# Patient Record
Sex: FEMALE | Race: AMERICAN INDIAN OR ALASKA NATIVE
[De-identification: names, ages, dates, MRNs, and addresses within clinical notes are randomized per-mention and may not be internally consistent; named-entity substitution may affect disease eponyms.]

---

## 2019-04-08 ENCOUNTER — HOSPITAL ENCOUNTER (EMERGENCY)
Dept: HOSPITAL 42 - ED | Age: 39
Discharge: HOME | End: 2019-04-08
Payer: COMMERCIAL

## 2019-04-08 VITALS — OXYGEN SATURATION: 100 % | TEMPERATURE: 98.6 F

## 2019-04-08 VITALS — HEART RATE: 73 BPM | SYSTOLIC BLOOD PRESSURE: 122 MMHG | DIASTOLIC BLOOD PRESSURE: 78 MMHG

## 2019-04-08 VITALS — BODY MASS INDEX: 26.8 KG/M2

## 2019-04-08 VITALS — RESPIRATION RATE: 18 BRPM

## 2019-04-08 DIAGNOSIS — R42: Primary | ICD-10-CM

## 2019-04-08 DIAGNOSIS — I27.20: ICD-10-CM

## 2019-04-08 DIAGNOSIS — R06.02: ICD-10-CM

## 2019-04-08 LAB
ALBUMIN SERPL-MCNC: 4.2 G/DL (ref 3–4.8)
ALBUMIN/GLOB SERPL: 1 {RATIO} (ref 1.1–1.8)
ALT SERPL-CCNC: 22 U/L (ref 7–56)
AMORPH SED URNS QL MICRO: (no result) /HPF
APPEARANCE UR: CLEAR
APTT BLD: 36.3 SECONDS (ref 26.9–38.3)
AST SERPL-CCNC: 33 U/L (ref 14–36)
BACTERIA #/AREA URNS HPF: (no result) /HPF
BASOPHILS # BLD AUTO: 0.02 K/MM3 (ref 0–2)
BASOPHILS NFR BLD: 0.3 % (ref 0–3)
BILIRUB UR-MCNC: NEGATIVE MG/DL
BNP SERPL-MCNC: 36.5 PG/ML (ref 0–450)
BUN SERPL-MCNC: 14 MG/DL (ref 7–21)
CALCIUM SERPL-MCNC: 9.1 MG/DL (ref 8.4–10.5)
COLOR UR: YELLOW
D DIMER PPP FEU-MCNC: 962 NG/MLDDU (ref 0–243)
EOSINOPHIL # BLD: 0.1 10*3/UL (ref 0–0.7)
EOSINOPHIL NFR BLD: 1.1 % (ref 1.5–5)
ERYTHROCYTE [DISTWIDTH] IN BLOOD BY AUTOMATED COUNT: 13.2 % (ref 11.5–14.5)
GFR NON-AFRICAN AMERICAN: > 60
GLUCOSE UR STRIP-MCNC: NEGATIVE MG/DL
HGB BLD-MCNC: 13.5 G/DL (ref 12–16)
INR PPP: 1.06
LEUKOCYTE ESTERASE UR-ACNC: NEGATIVE LEU/UL
LYMPHOCYTES # BLD: 2.5 10*3/UL (ref 1.2–3.4)
LYMPHOCYTES NFR BLD AUTO: 33.9 % (ref 22–35)
MCH RBC QN AUTO: 30.6 PG (ref 25–35)
MCHC RBC AUTO-ENTMCNC: 33.6 G/DL (ref 31–37)
MCV RBC AUTO: 91.2 FL (ref 80–105)
MONOCYTES # BLD AUTO: 0.5 10*3/UL (ref 0.1–0.6)
MONOCYTES NFR BLD: 7.2 % (ref 1–6)
PH UR STRIP: 7 [PH] (ref 4.7–8)
PLATELET # BLD: 249 10^3/UL (ref 120–450)
PMV BLD AUTO: 11.3 FL (ref 7–11)
PROT UR STRIP-MCNC: 30 MG/DL
PROTHROMBIN TIME: 12 SECONDS (ref 9.4–12.5)
RBC # BLD AUTO: 4.41 10^6/UL (ref 3.5–6.1)
RBC # UR STRIP: NEGATIVE /UL
RBC #/AREA URNS HPF: (no result) /HPF (ref 0–2)
SP GR UR STRIP: 1.02 (ref 1–1.03)
TROPONIN I SERPL-MCNC: < 0.01 NG/ML
UROBILINOGEN UR STRIP-ACNC: 1 E.U./DL
WBC # BLD AUTO: 7.4 10^3/UL (ref 4.5–11)

## 2019-04-08 PROCEDURE — 84703 CHORIONIC GONADOTROPIN ASSAY: CPT

## 2019-04-08 PROCEDURE — 71275 CT ANGIOGRAPHY CHEST: CPT

## 2019-04-08 PROCEDURE — 85025 COMPLETE CBC W/AUTO DIFF WBC: CPT

## 2019-04-08 PROCEDURE — 84484 ASSAY OF TROPONIN QUANT: CPT

## 2019-04-08 PROCEDURE — 80053 COMPREHEN METABOLIC PANEL: CPT

## 2019-04-08 PROCEDURE — 83615 LACTATE (LD) (LDH) ENZYME: CPT

## 2019-04-08 PROCEDURE — 83735 ASSAY OF MAGNESIUM: CPT

## 2019-04-08 PROCEDURE — 81001 URINALYSIS AUTO W/SCOPE: CPT

## 2019-04-08 PROCEDURE — 85730 THROMBOPLASTIN TIME PARTIAL: CPT

## 2019-04-08 PROCEDURE — 81025 URINE PREGNANCY TEST: CPT

## 2019-04-08 PROCEDURE — 85378 FIBRIN DEGRADE SEMIQUANT: CPT

## 2019-04-08 PROCEDURE — 99284 EMERGENCY DEPT VISIT MOD MDM: CPT

## 2019-04-08 PROCEDURE — 71045 X-RAY EXAM CHEST 1 VIEW: CPT

## 2019-04-08 PROCEDURE — 83880 ASSAY OF NATRIURETIC PEPTIDE: CPT

## 2019-04-08 PROCEDURE — 85610 PROTHROMBIN TIME: CPT

## 2019-04-08 PROCEDURE — 93005 ELECTROCARDIOGRAM TRACING: CPT

## 2019-04-08 PROCEDURE — 82550 ASSAY OF CK (CPK): CPT

## 2019-04-08 NOTE — CT
Date of service: 



04/08/2019



PROCEDURE:  CT Chest with contrast (Pulmonary Angiogram)



HISTORY:

sob elevated dimer



COMPARISON:

None available.



TECHNIQUE:

Axial computed tomography images were obtained of the chest in the 

pulmonary arterial phase of enhancement. Coronal and sagittal 

reformatted images were created and reviewed.



Intravenous contrast dose: 100 mL Omnipaque 350



Radiation dose:



Total exam DLP = 472.8 mGy-cm.



This CT exam was performed using one or more of the following dose 

reduction techniques: Automated exposure control, adjustment of the 

mA and/or kV according to patient size, and/or use of iterative 

reconstruction technique.



FINDINGS:



PULMONARY ARTERIES:

Unremarkable. No pulmonary embolism. 



AORTA:

No acute findings. No thoracic aortic aneurysm. No aortic 

atherosclerotic calcification or mural plaque present.



LUNGS:

Unremarkable. No nodule, mass or pulmonary consolidation. 



PLEURAL SPACES:

Unremarkable. No effusion or pneumothorax. 



HEART:

Unremarkable. No cardiomegaly. No significant pericardial effusion. 



LYMPH NODES:

No lymphadenopathy.



BONES, CHEST WALL:

Unremarkable. No fracture or destructive lesion 



OTHER FINDINGS:

1.5 cm nodule in right lobe of thyroid.  Correlation with thyroid 

ultrasound examination is advised on a nonemergent basis. 



IMPRESSION:

No evidence of pulmonary embolism.  No infiltrate or pleural 

effusion.  Incidental 1.5 cm nodule in right lobe of thyroid.  

Correlate with thyroid ultrasound examination.

## 2019-04-08 NOTE — ED PDOC
Arrival/HPI





- General


Time Seen by Provider: 04/08/19 09:30


Historian: Patient





- History of Present Illness


Narrative History of Present Illness (Text): 





04/08/19 09:46


39 year old female, with past medical history of pulmonary hypertension and 

asthma, presents to the ED for evaluation of dizziness/weakness prior to 

arrival. Patient states she was trying to catch a train when she experienced 

difficulty catching full breath and lightheadedness prompting her to present to 

the ED for medical evaluation. At bedside, patient is in no acute distress and 

has appropriate respiration. Patient denies any fevers, chills, headache, chest 

pain, cough, diaphoresis, abdominal pain, nausea, vomiting, diarrhea, back pain,

neck pain, or any other complaints.


 


Time/Duration: Prior to Arrival


Symptom Onset: Gradual


Symptom Course: Unchanged


Activities at Onset: Light





Past Medical History





- Provider Review


Nursing Documentation Reviewed: Yes





- Infectious Disease


Hx of Infectious Diseases: None





- Tetanus Immunization


Tetanus Immunization: Unknown





- Cardiac


Other/Comment: open heart surgery @ 3yo.  pulmonary stenosis





- Hematological/Oncological


Hx Anemia: Yes





- Psychiatric


Hx Depression: No


Hx Emotional Abuse: No


Hx Physical Abuse: No


Hx Substance Use: No





- Surgical History


Other/Comment: heart surgery @ 2y.o.





- Anesthesia


Hx Anesthesia: Yes


Hx Anesthesia Reactions: No


Hx Malignant Hyperthermia: No





- Suicidal Assessment


Feels Threatened In Home Enviroment: No





Family/Social History





- Physician Review


Nursing Documentation Reviewed: Yes


Family/Social History: Unknown Family HX


Smoking Status: Never Smoked


Hx Alcohol Use: Yes


Hx Substance Use: No


Hx Substance Use Treatment: No





Allergies/Home Meds


Allergies/Adverse Reactions: 


Allergies





aspirin Allergy (Verified 04/08/19 10:02)


   SHORTNESS OF BREATH


nickel Allergy (Verified 04/08/19 10:02)


   RASH


pineapple Allergy (Verified 04/08/19 10:02)


   ITCHING








Home Medications: 


                                    Home Meds











 Medication  Instructions  Recorded  Confirmed


 


Clonazepam [Klonopin] 0.5 mg PO PRN PRN 10/30/13 10/30/13














Review of Systems





- Physician Review


All systems were reviewed & negative as marked: Yes





- Review of Systems


Constitutional: absent: Fevers


Eyes: absent: Vision Changes


ENT: absent: Hearing Changes


Respiratory: SOB.  absent: Cough, Wheezing


Cardiovascular: absent: Chest Pain, Palpitations, JENNINGS, Syncope


Gastrointestinal: absent: Abdominal Pain, Diarrhea, Nausea, Vomiting


Genitourinary Female: absent: Dysuria, Urine Output Changes


Musculoskeletal: absent: Back Pain, Neck Pain


Skin: absent: Rash


Neurological: Dizziness.  absent: Headache, Focal Weakness, Gait Changes, Speech

 Changes


Endocrine: absent: Diaphoresis


Psychiatric: absent: Anxiety





Physical Exam


Vital Signs Reviewed: Yes





Vital Signs











  Temp Pulse Resp BP Pulse Ox


 


 04/08/19 09:40  98.4 F  86  18  146/92 H  98











Temperature: Afebrile


Blood Pressure: Normal


Pulse: Regular


Respiratory Rate: Normal


Appearance: Positive for: Well-Appearing, Non-Toxic, Comfortable


Pain Distress: None


Mental Status: Positive for: Alert and Oriented X 3





- Systems Exam


Head: Present: Atraumatic, Normocephalic


Pupils: Present: PERRL


Extroacular Muscles: Present: EOMI


Conjunctiva: Present: Normal


Pharnyx: Present: ERYTHEMA


Neck: Present: Normal Range of Motion


Respiratory/Chest: Present: Clear to Auscultation, Good Air Exchange.  No: 

Respiratory Distress, Accessory Muscle Use


Cardiovascular: Present: Regular Rate and Rhythm, Normal S1, S2.  No: Murmurs


Abdomen: No: Tenderness, Distention, Peritoneal Signs


Back: Present: Normal Inspection


Upper Extremity: Present: Normal Inspection.  No: Cyanosis, Edema


Lower Extremity: Present: Normal Inspection.  No: Edema


Neurological: Present: GCS=15, CN II-XII Intact, Speech Normal


Skin: Present: Warm, Dry, Normal Color.  No: Rashes


Psychiatric: Present: Alert, Oriented x 3, Normal Insight, Normal Concentration





Medical Decision Making


ED Course and Treatment: 





04/08/19 09:51


Impression:


39 year old female presents to the ED for evaluation of dizziness/weakness and 

difficulty catching her breath. ro pe, anemia, cardiac eitology





Plan:


-- EKG


-- Labs


-- Chest X-ray


-- Urinalysis


-- Reassess and disposition





Prior Visits:


Notes and results from previous visits were reviewed. 





Progress Notes:





04/08/19 09:52


EKG reviewed, shows NSR @85 bpm, no ST/T wave changes.


04/08/19 14:51


labs neg. dimer elevated cta neg. no leg pain. pt asking for dc. seen joking  in

 nad with significant other, well appearing. 





- RAD Interpretation


Radiology Orders: 











04/08/19 09:46


CHEST PORTABLE [RAD] Stat 














- EKG Interpretation


Interpreted by ED Physician: Yes


Type: 12 lead EKG





- Scribe Statement


The provider has reviewed the documentation as recorded by the Scribe


Radha Fuentes.





All medical record entries made by the Scribe were at my direction and 

personally dictated by me. I have reviewed the chart and agree that the record 

accurately reflects my personal performance of the history, physical exam, 

medical decision making, and the department course for this patient. I have also

 personally directed, reviewed, and agree with the discharge instructions and 

disposition.








Disposition/Present on Arrival





- Present on Arrival


Any Indicators Present on Arrival: No


History of DVT/PE: No


History of Uncontrolled Diabetes: No


Urinary Catheter: No


History Surgical Site Infection Following: None





- Disposition


Have Diagnosis and Disposition been Completed?: Yes


Diagnosis: 


 SOB (shortness of breath), Dizzy





Disposition: HOME/ ROUTINE


Disposition Time: 13:50


Condition: STABLE


Discharge Instructions (ExitCare):  Shortness of Breath (Dyspnea), Dizziness, 

Nonvertigo, (DC)


Additional Instructions: 


return toe r with worsening symptoms or concerns. 


Referrals: 


Kristine Felix MD [Primary Care Provider] - Follow up with primary


Forms:  [facility] (English), WORK NOTE

## 2019-04-08 NOTE — CARD
--------------- APPROVED REPORT --------------





Date of service: 04/08/2019



EKG Measurement

Heart [id number]

[id number]

[id number]

[id number]

[id number]



<Conclusion>

Normal sinus rhythm

Normal ECG

## 2019-04-08 NOTE — RAD
Date of service: 



04/08/2019



HISTORY:

 sob 



COMPARISON:

12/06/2016 



TECHNIQUE:

1 view obtained.



FINDINGS:



LUNGS:

No active pulmonary disease.



PLEURA:

No significant pleural effusion identified, no pneumothorax apparent.



CARDIOVASCULAR:

No aortic atherosclerotic calcification present.



Mild cardiomegaly no pulmonary vascular congestion. 



OSSEOUS STRUCTURES:

No significant abnormalities.



VISUALIZED UPPER ABDOMEN:

Normal.



OTHER FINDINGS:

None.



IMPRESSION:

No active disease.